# Patient Record
Sex: FEMALE | URBAN - NONMETROPOLITAN AREA
[De-identification: names, ages, dates, MRNs, and addresses within clinical notes are randomized per-mention and may not be internally consistent; named-entity substitution may affect disease eponyms.]

---

## 2020-04-13 ENCOUNTER — APPOINTMENT (OUTPATIENT)
Dept: URBAN - NONMETROPOLITAN AREA CLINIC 54 | Age: 40
Setting detail: DERMATOLOGY
End: 2020-04-13

## 2020-04-13 DIAGNOSIS — D18.0 HEMANGIOMA: ICD-10-CM

## 2020-04-13 DIAGNOSIS — Z85.820 PERSONAL HISTORY OF MALIGNANT MELANOMA OF SKIN: ICD-10-CM

## 2020-04-13 PROBLEM — D18.01 HEMANGIOMA OF SKIN AND SUBCUTANEOUS TISSUE: Status: ACTIVE | Noted: 2020-04-13

## 2020-04-13 PROCEDURE — OTHER ADDITIONAL NOTES: OTHER

## 2020-04-13 PROCEDURE — 99201: CPT

## 2020-04-13 PROCEDURE — OTHER COUNSELING: OTHER

## 2020-04-13 ASSESSMENT — LOCATION SIMPLE DESCRIPTION DERM: LOCATION SIMPLE: LEFT LIP

## 2020-04-13 ASSESSMENT — LOCATION ZONE DERM: LOCATION ZONE: LIP

## 2020-04-13 ASSESSMENT — LOCATION DETAILED DESCRIPTION DERM: LOCATION DETAILED: LEFT SUPERIOR VERMILION LIP

## 2020-04-13 NOTE — PROCEDURE: ADDITIONAL NOTES
Detail Level: Zone
Additional Notes: Recommended visit for [facility] after pandemic restrictions have been lifted.

## 2020-04-20 ENCOUNTER — APPOINTMENT (OUTPATIENT)
Dept: URBAN - NONMETROPOLITAN AREA CLINIC 54 | Age: 40
Setting detail: DERMATOLOGY
End: 2020-04-20

## 2020-04-20 DIAGNOSIS — L70.0 ACNE VULGARIS: ICD-10-CM

## 2020-04-20 PROCEDURE — OTHER PRESCRIPTION: OTHER

## 2020-04-20 PROCEDURE — 99212 OFFICE O/P EST SF 10 MIN: CPT

## 2020-04-20 PROCEDURE — OTHER TREATMENT REGIMEN: OTHER

## 2020-04-20 ASSESSMENT — LOCATION ZONE DERM: LOCATION ZONE: FACE

## 2020-04-20 ASSESSMENT — LOCATION SIMPLE DESCRIPTION DERM: LOCATION SIMPLE: LEFT CHEEK

## 2020-04-20 ASSESSMENT — LOCATION DETAILED DESCRIPTION DERM: LOCATION DETAILED: LEFT CENTRAL MALAR CHEEK

## 2020-04-20 NOTE — HPI: PIMPLES (ACNE)
What Type Of Note Output Would You Prefer (Optional)?: Bullet Format
Is This A New Presentation, Or A Follow-Up?: Acne
Additional Comments (Use Complete Sentences): Pt had baby 6 months ago

## 2020-04-20 NOTE — PROCEDURE: TREATMENT REGIMEN
Hide Vanicream Products: No
Detail Level: Zone
Samples Given: Non-comedogenic cleansers and sunscreens (left at  for patient to )
Action 3: Continue
Other Instructions: Consider spironolactone if no better in 6-8 weeks
Start Regimen: Azelaic acid 20%/niacinamide 2% gel to face QAM ([facility] #[id number])\\nTretinoin 0.05%/ ascorbic acid 1.67% cream to face QHS ([facility] #[id number])

## 2021-02-10 ENCOUNTER — RX ONLY (RX ONLY)
Age: 41
End: 2021-02-10

## 2021-02-10 RX ORDER — TRETIONIN 0.5 MG/G
1 CREAM TOPICAL QHS
Qty: 30 | Refills: 6 | Status: ERX

## 2021-02-10 RX ORDER — AZELAIC ACID 0.2 G/G
1 CREAM CUTANEOUS QAM
Qty: 30 | Refills: 6 | Status: ERX